# Patient Record
Sex: MALE | Race: WHITE | URBAN - METROPOLITAN AREA
[De-identification: names, ages, dates, MRNs, and addresses within clinical notes are randomized per-mention and may not be internally consistent; named-entity substitution may affect disease eponyms.]

---

## 2023-11-06 ENCOUNTER — OFFICE VISIT (OUTPATIENT)
Dept: URGENT CARE | Age: 58
End: 2023-11-06

## 2023-11-06 VITALS
WEIGHT: 230 LBS | RESPIRATION RATE: 17 BRPM | OXYGEN SATURATION: 95 % | HEIGHT: 70 IN | SYSTOLIC BLOOD PRESSURE: 130 MMHG | DIASTOLIC BLOOD PRESSURE: 88 MMHG | BODY MASS INDEX: 32.93 KG/M2 | HEART RATE: 99 BPM

## 2023-11-06 DIAGNOSIS — B02.9 HERPES ZOSTER WITHOUT COMPLICATION: Primary | ICD-10-CM

## 2023-11-06 DIAGNOSIS — R09.81 NASAL CONGESTION: ICD-10-CM

## 2023-11-06 DIAGNOSIS — R05.1 ACUTE COUGH: ICD-10-CM

## 2023-11-06 RX ORDER — OMEPRAZOLE 20 MG/1
20 CAPSULE, DELAYED RELEASE ORAL DAILY PRN
COMMUNITY

## 2023-11-06 RX ORDER — LIDOCAINE 50 MG/G
1 PATCH TOPICAL PRN
COMMUNITY

## 2023-11-06 RX ORDER — LOSARTAN POTASSIUM 50 MG/1
50 TABLET ORAL DAILY
COMMUNITY

## 2023-11-06 RX ORDER — SILDENAFIL 100 MG/1
100 TABLET, FILM COATED ORAL PRN
COMMUNITY

## 2023-11-06 RX ORDER — DEXTROMETHORPHAN HBR AND PYRILAMINE MALEATE 30; 30 MG/1; MG/1
1 TABLET ORAL 4 TIMES DAILY PRN
Qty: 40 TABLET | Refills: 0 | Status: SHIPPED | OUTPATIENT
Start: 2023-11-06

## 2023-11-06 RX ORDER — VALACYCLOVIR HYDROCHLORIDE 1 G/1
1000 TABLET, FILM COATED ORAL 3 TIMES DAILY
Qty: 21 TABLET | Refills: 0 | Status: SHIPPED | OUTPATIENT
Start: 2023-11-06 | End: 2023-11-13

## 2023-11-06 ASSESSMENT — ENCOUNTER SYMPTOMS
RHINORRHEA: 1
DIARRHEA: 0
SHORTNESS OF BREATH: 0
ABDOMINAL PAIN: 0
COUGH: 1
SORE THROAT: 0
VOMITING: 0
NAUSEA: 0

## 2023-11-06 NOTE — PROGRESS NOTES
Rowdy Durant (: 1965) is a 59 y.o. male,New patient, here for evaluation of the following chief complaint(s):  Rash (Possible shingles )      ASSESSMENT/PLAN:    ICD-10-CM    1. Herpes zoster without complication  A29.5 valACYclovir (VALTREX) 1 g tablet      2. Nasal congestion  R09.81 Dextromethorphan-Pyrilamine (CAPRON DMT) 30-30 MG TABS      3. Acute cough  R05.1 Dextromethorphan-Pyrilamine (CAPRON DMT) 30-30 MG TABS          Exam concerning shingles rash due to the presence of the rash over a single dermatome, extremely tender to the touch, and the tightly packed clusters of clear vesicles. Low concern for cellulitis, abrasions, burns, excoriation, or injury  Valtrex prescribed for antiviral treatment - take as directed. Congestion and cough symptoms, with PND and nasal congestion on exam, concern for viral URI co-infection - low concern for bacterial infection at this time due to lack of purulent findings and length of illness of 4 days. Recommend OTC treatment for symptoms:  ibuprofen (Advil, Motrin) and acetaminophen (Tylenol) for fevers and body aches. antihistamines (Claritin, Zyrtec, Allegra) and nasal steroid sprays (Flonase) to help with nasal congestion and runny nose. throat sprays (Cepacol, chloraseptic) for throat pain. dextromethorphan (Robitussin, Delsym), throat lozenges, or honey (1-2 teaspoons every hour) for cough. warm teas, humidifiers, nasal lavages, and sleeping in an inclined position are also helpful options that can lessen symptoms. [place] DMT prescribed for cough and congestion relief. Follow up with your PCP or return to the clinic in 7 days if symptoms persist or if symptoms worsen. SUBJECTIVE/OBJECTIVE:  HPI:   59 y.o. male presents for complaint of possible shingles over his left flank x yesterday. States he flew up from [place] for the weekend and notes the rash after getting off the plane yesterday.   Denies any recent numbness, tingling sensations

## 2023-11-07 NOTE — PATIENT INSTRUCTIONS
Exam concerning shingles rash  Valtrex prescribed for antiviral treatment - take as directed. Recommend OTC treatment for symptoms:  ibuprofen (Advil, Motrin) and acetaminophen (Tylenol) for fevers and body aches. antihistamines (Claritin, Zyrtec, Allegra) and nasal steroid sprays (Flonase) to help with nasal congestion and runny nose. throat sprays (Cepacol, chloraseptic) for throat pain. dextromethorphan (Robitussin, Delsym), throat lozenges, or honey (1-2 teaspoons every hour) for cough. warm teas, humidifiers, nasal lavages, and sleeping in an inclined position are also helpful options that can lessen symptoms. [place] DMT prescribed for cough and congestion relief. Follow up with your PCP or return to the clinic in 5 days if symptoms persist or if symptoms worsen.     New Prescriptions    DEXTROMETHORPHAN-PYRILAMINE (CAPRON DMT) 30-30 MG TABS    Take 1 tablet by mouth 4 times daily as needed (cough and congestion)    VALACYCLOVIR (VALTREX) 1 G TABLET    Take 1 tablet by mouth 3 times daily for 7 days